# Patient Record
Sex: MALE | Race: WHITE | Employment: FULL TIME | URBAN - METROPOLITAN AREA
[De-identification: names, ages, dates, MRNs, and addresses within clinical notes are randomized per-mention and may not be internally consistent; named-entity substitution may affect disease eponyms.]

---

## 2017-07-31 PROBLEM — Z01.818 PRE-OP TESTING: Status: ACTIVE | Noted: 2017-07-31

## 2017-07-31 PROBLEM — N20.0 KIDNEY STONE: Status: ACTIVE | Noted: 2017-07-31

## 2017-08-16 ENCOUNTER — HOSPITAL ENCOUNTER (OUTPATIENT)
Age: 52
Setting detail: OUTPATIENT SURGERY
Discharge: HOME | End: 2017-08-16
Attending: UROLOGY | Admitting: UROLOGY
Payer: COMMERCIAL

## 2017-08-16 ENCOUNTER — ANESTHESIA (OUTPATIENT)
Dept: SURGERY | Age: 52
End: 2017-08-16
Payer: COMMERCIAL

## 2017-08-16 ENCOUNTER — APPOINTMENT (OUTPATIENT)
Dept: GENERAL RADIOLOGY | Age: 52
End: 2017-08-16
Attending: UROLOGY
Payer: COMMERCIAL

## 2017-08-16 ENCOUNTER — ANESTHESIA EVENT (OUTPATIENT)
Dept: SURGERY | Age: 52
End: 2017-08-16
Payer: COMMERCIAL

## 2017-08-16 VITALS
HEART RATE: 65 BPM | BODY MASS INDEX: 30.18 KG/M2 | RESPIRATION RATE: 18 BRPM | SYSTOLIC BLOOD PRESSURE: 110 MMHG | HEIGHT: 68 IN | OXYGEN SATURATION: 99 % | DIASTOLIC BLOOD PRESSURE: 76 MMHG | TEMPERATURE: 97.2 F | WEIGHT: 199.13 LBS

## 2017-08-16 PROBLEM — N20.1 RIGHT URETERAL CALCULUS: Status: ACTIVE | Noted: 2017-08-16

## 2017-08-16 PROCEDURE — 76060000032 HC ANESTHESIA 0.5 TO 1 HR: Performed by: UROLOGY

## 2017-08-16 PROCEDURE — 76210000020 HC REC RM PH II FIRST 0.5 HR: Performed by: UROLOGY

## 2017-08-16 PROCEDURE — 50590 FRAGMENTING OF KIDNEY STONE: CPT | Performed by: UROLOGY

## 2017-08-16 PROCEDURE — 74000 XR ABD (KUB): CPT

## 2017-08-16 PROCEDURE — 74011250636 HC RX REV CODE- 250/636

## 2017-08-16 RX ORDER — SODIUM CHLORIDE 0.9 % (FLUSH) 0.9 %
5-10 SYRINGE (ML) INJECTION AS NEEDED
Status: DISCONTINUED | OUTPATIENT
Start: 2017-08-16 | End: 2017-08-16 | Stop reason: HOSPADM

## 2017-08-16 RX ORDER — CEFAZOLIN SODIUM 2 G/50ML
2 SOLUTION INTRAVENOUS
Status: DISCONTINUED | OUTPATIENT
Start: 2017-08-16 | End: 2017-08-16 | Stop reason: HOSPADM

## 2017-08-16 RX ORDER — ZOLPIDEM TARTRATE 10 MG/1
1 TABLET ORAL
COMMUNITY
Start: 2017-07-31

## 2017-08-16 RX ORDER — SODIUM CHLORIDE 0.9 % (FLUSH) 0.9 %
5-10 SYRINGE (ML) INJECTION EVERY 8 HOURS
Status: DISCONTINUED | OUTPATIENT
Start: 2017-08-16 | End: 2017-08-16 | Stop reason: HOSPADM

## 2017-08-16 RX ORDER — FAMOTIDINE 20 MG/1
20 TABLET, FILM COATED ORAL ONCE
Status: DISCONTINUED | OUTPATIENT
Start: 2017-08-17 | End: 2017-08-16 | Stop reason: HOSPADM

## 2017-08-16 RX ORDER — ALBUTEROL SULFATE 90 UG/1
2 AEROSOL, METERED RESPIRATORY (INHALATION) AS NEEDED
COMMUNITY
Start: 2017-08-09

## 2017-08-16 RX ORDER — SODIUM CHLORIDE, SODIUM LACTATE, POTASSIUM CHLORIDE, CALCIUM CHLORIDE 600; 310; 30; 20 MG/100ML; MG/100ML; MG/100ML; MG/100ML
50 INJECTION, SOLUTION INTRAVENOUS CONTINUOUS
Status: DISCONTINUED | OUTPATIENT
Start: 2017-08-17 | End: 2017-08-16 | Stop reason: HOSPADM

## 2017-08-16 RX ORDER — FLUTICASONE FUROATE AND VILANTEROL 200; 25 UG/1; UG/1
1 POWDER RESPIRATORY (INHALATION)
COMMUNITY
Start: 2017-08-09

## 2017-08-16 NOTE — OP NOTES
Extracorporeal Shock Wave Lithotripsy Operative Note      Preoperative Diagnosis:  4 mm x 3 mm right proximal ureteral calculus  Stone Type: not known. Postoperative Diagnosis: no stone visualized on total ureteral fluoroscopy and simulation. Procedure: Procedure(s):  LITHOTRIPSY EXTRACORPOREAL SHOCKWAVE ESWL right, Simulation only. Surgeon(s): Irina Franz MD    Anesthesia: none  EBL:  None  Tubes and Drains:  None  Complications:  None    I discussed with patient options of shock wave lithotripsy (ESWL) and ureteroscopy (URS), either of which would be reasonable options for the patient. Explained that ureteroscopy has higher chance of success with a single treatment relative to SWL. However, it is more invasive and it is possible that will not be able to access ureter during initial procedure. ESWL risks include anesthesia risk, bleeding/hematoma, severe infection/sepsis, injury to ureter, kidney and other organs, and potential need for repeat procedures either due to inadequate breakup of the stone or Steinstrasse. He desires planned ESWL. Description of Procedure: The patient was identified and surgical site verification was performed prior to obtaining consent. The patient was brought to the lithotripsy suite and transferred to the lithotripsy table. The stone was not visualized with fluoroscopy. After multiple attempts and views of the entire right ureteral area, we could not visualize a ureteral calculus. I do believe there may be a small calculus in the dependent portion of the bladder. The procedure was then terminated as there was only simulation done and no formal ESWL. He will FU with me in 2 weeks for stone prevention protocols.     Irina Franz MD        8/16/2017  9:06 AM

## 2017-08-16 NOTE — IP AVS SNAPSHOT
[address] Patient: Manjit Gee MRN: [id number] :1965 You are allergic to the following Allergen Reactions Sting, Bee Swelling Recent Documentation Height Weight BMI Smoking Status 1.727 m 90.3 kg 30.28 kg/m2 Former Smoker Emergency Contacts Name Discharge Info Relation Home Work Mobile Tasha Gary DISCHARGE CAREGIVER [3] Spouse [3]   [phone] Gely Flash  Spouse [3] [phone] About your hospitalization You were admitted on:  2017 You last received care in the:  [address] [place] [address] Road You were discharged on:  2017 Unit phone number:  [phone] Why you were hospitalized Your primary diagnosis was:  Not on File Your diagnoses also included:  Right Ureteral Calculus Providers Seen During Your Hospitalizations Provider Role Specialty Primary office phone Peter Eckert MD Attending Provider Urology [phone] Your Primary Care Physician (PCP) Primary Care Physician Office Phone Office Fax Juancarlos Vera [phone] Follow-up Information Follow up With Details Comments Contact Info Hadley Huang MD   [address] [facility] [address] [place] [address] [id number] [phone] Your Appointments   9:45 AM EDT  
XRAY Visit with Ching Michaels [facility]. [address] ([address] [facility]) [address] [address]  
[phone]  10:15 AM EDT  
POST OP with Amador Mead MD  
[facility]. [address] ([address]) [address] [address]  
[phone] 2017 SHOULDER ARTHROSCOPY ACROMIOPLASTY ROTATOR CUFF REPAIR with Cyndi Love MD  
 [facility] SURGERY ([facility]) [address] [address]  
[phone] Friday September 01, 2017  7:30 AM EDT  
[facility] PSAT 60 MIN with [facility] PSAT RM 1  
[facility] OR PSAT ([facility]) [address] [address]  
[phone] Current Discharge Medication List  
  
CONTINUE these medications which have NOT CHANGED Dose & Instructions Dispensing Information Comments Morning Noon Evening Bedtime  
 albuterol 90 mcg/actuation inhaler Commonly known as:  PROVENTIL HFA, VENTOLIN HFA, PROAIR HFA Your last dose was: Your next dose is:    
   
   
 Dose:  2 Puff Take 2 Puffs by inhalation as needed. Refills:  0  
     
   
   
   
  
 CRESTOR 40 mg tablet Generic drug:  rosuvastatin Your last dose was: Your next dose is:    
   
   
  Refills:  0  
     
   
   
   
  
 ezetimibe 10 mg tablet Commonly known as:  Kristy Tavarez Your last dose was: Your next dose is:    
   
   
  Refills:  0  
     
   
   
   
  
 fenofibric acid 135 mg capsule Commonly known as:  TRILIPIX ER Your last dose was: Your next dose is:    
   
   
  Refills:  0  
     
   
   
   
  
 fluticasone-vilanterol 200-25 mcg/dose inhaler Commonly known as:  BREO ELLIPTA Your last dose was: Your next dose is:    
   
   
 Dose:  1 Puff Take 1 Puff by inhalation daily. Refills:  0  
     
   
   
   
  
 metoprolol succinate 50 mg XL tablet Commonly known as:  TOPROL-XL Your last dose was: Your next dose is:    
   
   
  Refills:  0  
     
   
   
   
  
 oxyCODONE-acetaminophen 5-325 mg per tablet Commonly known as:  PERCOCET Your last dose was: Your next dose is:    
   
   
 Dose:  1 Tab Take 1 Tab by mouth every four (4) hours as needed for Pain. Max Daily Amount: 6 Tabs. Quantity:  16 Tab Refills:  0  
     
   
   
   
  
 tamsulosin 0.4 mg capsule Commonly known as:  FLOMAX Your last dose was: Your next dose is:    
   
   
 Dose:  0.4 mg Take 1 Cap by mouth daily (after dinner). Quantity:  30 Cap Refills:  1  
     
   
   
   
  
 zolpidem 10 mg tablet Commonly known as:  AMBIEN Your last dose was: Your next dose is:    
   
   
 Dose:  1 Tab Take 1 Tab by mouth nightly. Refills:  0 Discharge Instructions DISCHARGE SUMMARY from Nurse The following personal items are in your possession at time of discharge: 
 
Dental Appliances: None Visual Aid: None Home Medications: None Jewelry: None Clothing: Pants, Shirt, Footwear, Socks, Undergarments Other Valuables: None PATIENT INSTRUCTIONS: 
 
After general anesthesia or intravenous sedation, for 24 hours or while taking prescription Narcotics: · Limit your activities · Do not drive and operate hazardous machinery · Do not make important personal or business decisions · Do  not drink alcoholic beverages · If you have not urinated within 8 hours after discharge, please contact your surgeon on call. Report the following to your surgeon: 
· Excessive pain, swelling, redness or odor of or around the surgical area · Temperature over 100.5 · Nausea and vomiting lasting longer than 4 hours or if unable to take medications · Any signs of decreased circulation or nerve impairment to extremity: change in color, persistent  numbness, tingling, coldness or increase pain · Any questions What to do at Home: These are general instructions for a healthy lifestyle: No smoking/ No tobacco products/ Avoid exposure to second hand smoke Surgeon General's Warning:  Quitting smoking now greatly reduces serious risk to your health. Obesity, smoking, and sedentary lifestyle greatly increases your risk for illness A healthy diet, regular physical exercise & weight monitoring are important for maintaining a healthy lifestyle You may be retaining fluid if you have a history of heart failure or if you experience any of the following symptoms:  Weight gain of 3 pounds or more overnight or 5 pounds in a week, increased swelling in our hands or feet or shortness of breath while lying flat in bed. Please call your doctor as soon as you notice any of these symptoms; do not wait until your next office visit. Recognize signs and symptoms of STROKE: 
 
F-face looks uneven A-arms unable to move or move unevenly S-speech slurred or non-existent T-time-call 911 as soon as signs and symptoms begin-DO NOT go Back to bed or wait to see if you get better-TIME IS BRAIN. Warning Signs of HEART ATTACK Call 911 if you have these symptoms: 
? Chest discomfort. Most heart attacks involve discomfort in the center of the chest that lasts more than a few minutes, or that goes away and comes back. It can feel like uncomfortable pressure, squeezing, fullness, or pain. ? Discomfort in other areas of the upper body. Symptoms can include pain or discomfort in one or both arms, the back, neck, jaw, or stomach. ? Shortness of breath with or without chest discomfort. ? Other signs may include breaking out in a cold sweat, nausea, or lightheadedness. Don't wait more than five minutes to call [address]! Fast action can save your life. Calling 911 is almost always the fastest way to get lifesaving treatment. Emergency Medical Services staff can begin treatment when they arrive  up to an hour sooner than if someone gets to the hospital by car. The discharge information has been reviewed with the patient. The patient verbalized understanding. Discharge medications reviewed with the patient and appropriate educational materials and side effects teaching were provided. Patient armband removed and given to patient to take home. Patient was informed of the privacy risks if armband lost or stolen Discharge Orders None Introducing [facility]! Guerline Ayala introduces [employer] patient portal. Now you can access parts of your medical record, email your doctor's office, and request medication refills online. 1. In your internet browser, go to [email or web address] 2. Click on the First Time User? Click Here link in the Sign In box. You will see the New Member Sign Up page. 3. Enter your [employer] Access Code exactly as it appears below. You will not need to use this code after youve completed the sign-up process. If you do not sign up before the expiration date, you must request a new code. · [employer] Access Code: [email or web address] Expires: 10/23/2017 10:11 AM 
 
4. Enter the last four digits of your Social Security Number (xxxx) and Date of Birth (mm/dd/yyyy) as indicated and click Submit. You will be taken to the next sign-up page. 5. Create a [employer] ID. This will be your [employer] login ID and cannot be changed, so think of one that is secure and easy to remember. 6. Create a [employer] password. You can change your password at any time. 7. Enter your Password Reset Question and Answer. This can be used at a later time if you forget your password. 8. Enter your e-mail address. You will receive e-mail notification when new information is available in [address] 9. Click Sign Up. You can now view and download portions of your medical record. 10. Click the Download Summary menu link to download a portable copy of your medical information. If you have questions, please visit the Frequently Asked Questions section of the [employer] website. Remember, [employer] is NOT to be used for urgent needs. For medical emergencies, dial 911. Now available from your iPhone and Android! General Information Please provide this summary of care documentation to your next provider. Patient Signature:  ____________________________________________________________ Date:  ____________________________________________________________  
  
Karla Fines Provider Signature:  ____________________________________________________________ Date:  ____________________________________________________________

## 2017-08-16 NOTE — DISCHARGE INSTRUCTIONS
DISCHARGE SUMMARY from Nurse    The following personal items are in your possession at time of discharge:    Dental Appliances: None  Visual Aid: None     Home Medications: None  Jewelry: None  Clothing: Pants, Shirt, Footwear, Socks, Undergarments  Other Valuables: None             PATIENT INSTRUCTIONS:    After general anesthesia or intravenous sedation, for 24 hours or while taking prescription Narcotics:  · Limit your activities  · Do not drive and operate hazardous machinery  · Do not make important personal or business decisions  · Do  not drink alcoholic beverages  · If you have not urinated within 8 hours after discharge, please contact your surgeon on call. Report the following to your surgeon:  · Excessive pain, swelling, redness or odor of or around the surgical area  · Temperature over 100.5  · Nausea and vomiting lasting longer than 4 hours or if unable to take medications  · Any signs of decreased circulation or nerve impairment to extremity: change in color, persistent  numbness, tingling, coldness or increase pain  · Any questions        What to do at Home:  These are general instructions for a healthy lifestyle:    No smoking/ No tobacco products/ Avoid exposure to second hand smoke    Surgeon General's Warning:  Quitting smoking now greatly reduces serious risk to your health. Obesity, smoking, and sedentary lifestyle greatly increases your risk for illness    A healthy diet, regular physical exercise & weight monitoring are important for maintaining a healthy lifestyle    You may be retaining fluid if you have a history of heart failure or if you experience any of the following symptoms:  Weight gain of 3 pounds or more overnight or 5 pounds in a week, increased swelling in our hands or feet or shortness of breath while lying flat in bed. Please call your doctor as soon as you notice any of these symptoms; do not wait until your next office visit.     Recognize signs and symptoms of STROKE:    F-face looks uneven    A-arms unable to move or move unevenly    S-speech slurred or non-existent    T-time-call 911 as soon as signs and symptoms begin-DO NOT go       Back to bed or wait to see if you get better-TIME IS BRAIN. Warning Signs of HEART ATTACK     Call 911 if you have these symptoms:   Chest discomfort. Most heart attacks involve discomfort in the center of the chest that lasts more than a few minutes, or that goes away and comes back. It can feel like uncomfortable pressure, squeezing, fullness, or pain.  Discomfort in other areas of the upper body. Symptoms can include pain or discomfort in one or both arms, the back, neck, jaw, or stomach.  Shortness of breath with or without chest discomfort.  Other signs may include breaking out in a cold sweat, nausea, or lightheadedness. Don't wait more than five minutes to call 911 - MINUTES MATTER! Fast action can save your life. Calling 911 is almost always the fastest way to get lifesaving treatment. Emergency Medical Services staff can begin treatment when they arrive -- up to an hour sooner than if someone gets to the hospital by car. The discharge information has been reviewed with the patient. The patient verbalized understanding. Discharge medications reviewed with the patient and appropriate educational materials and side effects teaching were provided. Patient armband removed and given to patient to take home.   Patient was informed of the privacy risks if armband lost or stolen

## 2017-08-16 NOTE — H&P
8/16/2017        ASSESSMENT:       ICD-10-CM ICD-9-CM     1. Kidney stone N20.0 592.0 AMB POC URINALYSIS DIP STICK AUTO W/O MICRO               PLAN:       1. Right ureteral stone- ESWL vs Ureteroscopy   I discussed with patient options of shock wave lithotripsy (SWL) and ureteroscopy (URS), either of which would be reasonable options for the patient. Explained that ureteroscopy has higher chance of success with a single treatment relative to SWL. However, it is more invasive and it is possible that will not be able to access the ureter during initial procedure (in which case would typically return to OR in 1-2 weeks after passive dilation with stent). It would require a stent which can be quite bothersome. SWL risks include anesthesia risk, bleeding/hematoma, severe infection/sepsis, injury to ureter, kidney and other organs, and potential need for repeat procedures either due to inadequate breakup of the stone or Steinstrasse. The patient may still require a stent which I will leave to the discression of my partner who will be treating the stone. Will check a KUB today to see if the stone is able to be visualized. If possible, will be scheduled for right ESWL after cardiac clearance. He will be scheduled at the next available date. Unfortunately KUB does not show a stone today. He will be simulated and treated today if the stone is visualized.     2. Prostate cancer screening- no prior records. Will check PSA after acute stone episode has resolved.                DISCUSSION:           Chief Complaint   Patient presents with    Kidney Stone       4 mm. Referred by Dr Soy You. Pt states it started over a week ago. Pt went to [facility].         HISTORY OF PRESENT ILLNESS:  Carri Nieto is a 46 y.o. male who presents today for consultation and has been referred by Dr. Aditya Nicholas.   He states his symptoms started with back pain last Tuesday around 4 AM, symptoms started with back pain that radiated to the front, constant. He had nausea with dry heaving intermittently as well. Denies gross hematuria. He is using a strainer and has not seen any stone pass. He had fever and chills a couple of days ago, he is taking Flomax daily, has also been increasing his water intake. Denies prior stones. He is taking Percocet which does alleviate his pain. 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 No cardiac issues since that time.      Flank pain: YES  Decreased output: NO  Difficulty voiding: NO  Burning with urination: YES  Notable blood in urine:  NO  Incontinence:  NO  Nocturia:   YES  How many times? 1       Review of Systems  Constitutional: Fever: No  Skin: Rash: No  HEENT: Hearing difficulty: No  Eyes: Blurred vision: No  Cardiovascular: Chest pain: No  Respiratory: Shortness of breath: No  Gastrointestinal: Nausea/vomiting: Yes  Musculoskeletal: Back pain: Yes  Neurological: Weakness: No  Psychological: Memory loss: No  Comments/additional findings:           Past Medical History:   Diagnosis Date    CAD (coronary artery disease)      Gastrointestinal disorder      Kidney stone                 Past Surgical History:   Procedure Laterality Date    CARDIAC SURG PROCEDURE UNLIST                  Social History   Substance Use Topics    Smoking status: Former Smoker    Smokeless tobacco: Never Used    Alcohol use No              Allergies   Allergen Reactions    Sting, Bee Swelling         History reviewed. No pertinent family history.            Current Outpatient Prescriptions   Medication Sig Dispense Refill    CRESTOR 40 mg tablet     0    ezetimibe (ZETIA) 10 mg tablet     0    metoprolol succinate (TOPROL-XL) 50 mg XL tablet     0    fenofibric acid (TRILIPIX ER) 135 mg capsule     0    oxyCODONE-acetaminophen (PERCOCET) 5-325 mg per tablet Take 1 Tab by mouth every four (4) hours as needed for Pain. Max Daily Amount: 6 Tabs.  16 Tab 0    ondansetron (ZOFRAN ODT) 4 mg disintegrating tablet Take 1 Tab by mouth every eight (8) hours as needed for Nausea. 30 Tab 0    tamsulosin (FLOMAX) 0.4 mg capsule Take 1 Cap by mouth daily for 15 days. 15 Cap 0         PHYSICAL EXAMINATION:        Visit Vitals    /76    Ht 5' 8\" (1.727 m)    Wt 199 lb (90.3 kg)    BMI 30.26 kg/m2      Constitutional: WDWN, Pleasant and appropriate affect, No acute distress. CV:  No peripheral swelling noted, RRR  Respiratory: No respiratory distress or difficulties, CTAB  Abdomen:  No abdominal masses or tenderness.  Male:  Right CVA tenderness  Skin: No jaundice. Neuro/Psych:  Alert and oriented x 3, affect appropriate. Lymphatic:   No enlarged inguinal lymph nodes.          REVIEW OF LABS AND IMAGING:             Results for orders placed or performed in visit on 07/31/17   AMB POC URINALYSIS DIP STICK AUTO W/O MICRO   Result Value Ref Range     Color (UA POC) Yellow       Clarity (UA POC) Clear       Glucose (UA POC) Negative Negative     Bilirubin (UA POC) Negative Negative     Ketones (UA POC) Negative Negative     Specific gravity (UA POC) 1.020 1.001 - 1.035     Blood (UA POC) Trace Negative     pH (UA POC) 6.5 4.6 - 8.0     Protein (UA POC) Negative Negative mg/dL     Urobilinogen (UA POC) 0.2 mg/dL 0.2 - 1     Nitrites (UA POC) Negative Negative     Leukocyte esterase (UA POC) Negative Negative            Imaging Report Reviewed? YES  Images Reviewed? NO     7/25/17 EXAMINATION: CT ABD PELV WO CONT      CLINICAL INDICATION: Stone? Right flank/right abdominal pain           COMPARISON:  None      TECHNIQUE:  Multiple contiguous axial CT images at 5  mm increments were  obtained from the bases of the lungs to the  pubic symphysis without contrast.      FINDINGS:  4 mm stone proximal right ureter causing mild right hydronephrosis.     Bibasilar subpleural thickening and chronic subpleural scarring.      Left subdiaphragmatic hepatic low density 15 mm. Partially evaluated.  Right  posterior medial hepatic low-density 19 mm on image 22 series 2, partially  evaluated. Gallbladder and pancreas and spleen normal. Adrenal glands normal.      Moderate scarring plaquing of the aorta. Punctate calcification left mid kidney.      Extensive sigmoid diverticulosis. Mild prostate enlargement with calcifications.     IMPRESSION:       1. Moderate hydronephrosis secondary to a 4 mm proximal right ureteral stone. 2. Low densities in the liver, partially evaluated. Nonemergent CT with  intravenous contrast would be the next up in evaluation. 3. Nonobstructive punctate gas occasional left kidney. 4. Extensive sigmoid diverticulosis  5. Bibasilar pleural thickening and subpleural scarring. 6. Mild prostate enlargement. Please correlate clinically.     Other Lab Data Reviewed?    NO        Anil Raines MD

## 2017-10-13 ENCOUNTER — HOSPITAL ENCOUNTER (OUTPATIENT)
Dept: PHYSICAL THERAPY | Age: 52
Discharge: HOME | End: 2017-10-13
Payer: COMMERCIAL

## 2017-10-13 PROCEDURE — 97110 THERAPEUTIC EXERCISES: CPT

## 2017-10-13 PROCEDURE — 97162 PT EVAL MOD COMPLEX 30 MIN: CPT

## 2017-10-13 PROCEDURE — 97140 MANUAL THERAPY 1/> REGIONS: CPT

## 2017-10-13 NOTE — PROGRESS NOTES
[address] [facility] AT [address], [address] [place] [address] Road  Phone: [phone]   [phone]  PLAN OF CARE / [address] [place] [address] PHYSICAL THERAPY SERVICES  Patient Name: Elpidio Amaro : 1965   Medical   Diagnosis: Left shoulder pain [M25.512] Treatment Diagnosis: M25.512   Onset Date: 17     Referral Source: Estephania Dueñas MD [facility]): 10/13/2017   Prior Hospitalization: See medical history Provider #: [id number]   Prior Level of Function: Independent w/ ADL's   Comorbidities: Heart disease, Asthma   Medications: Verified on Patient Summary List   The Plan of Care and following information is based on the information from the initial evaluation.   ===========================================================================================  Assessment / key information:  Patient is a 45 y/o male presenting s/p left RTC repair (17) and (tenodesis?) to bicep tendon. Patient reports discontinuing sling a few days ago and reports general aching of 3/10, up to 5-6/10 with inadvertent quick or jerking movements. Patient presents with mild/mod left RTC and deltoid atrophy. There is moderate TTP to the right bicep tendon and pectoral muscle. Passive ROM of the L shoulder is: Flexion 90 degrees, abduction 65 degrees, ER 30 degrees in scapular plane, IR full in scapular plane. Strength testing deferred at this time. The patient was instructed in a home exercise program to address the above findings/deficits.   The patient should benefit from therapy services to progress toward functional goals and improve quality of life.  ===========================================================================================  History MEDIUM  Complexity : 1-2 comorbidities / personal factors will impact the outcome/ POC ; Examination MEDIUM Complexity : 3 Standardized tests and measures addressing body structure, function, activity limitation and / or participation in recreation ; Presentation MEDIUM Complexity : Evolving with changing characteristics ; Decision Making MEDIUM Complexity : FOTO score of 26-74; Complexity MEDIUM  Problem List: pain affecting function, decrease ROM, decrease strength, decrease ADL/ functional abilitiies, decrease activity tolerance and decrease flexibility/ joint mobility   Treatment Plan may include any combination of the following: Therapeutic exercise, Therapeutic activities, Physical agent/modality, Manual therapy and Patient education  Patient / Family readiness to learn indicated by: asking questions, trying to perform skills and interest  Persons(s) to be included in education: patient (P)  Barriers to Learning/Limitations: None  Measures taken:    Patient Goal (s): Back to normal use without pain   Patient self reported health status: good  Rehabilitation Potential: good   Short Term Goals: To be accomplished in  3-4  weeks: Independent/compliant with long term HEP for shoulder ROM  Improve passive shoulder flexion to 135 degrees to improve ADL's  Able to lift upper extremity actively to 90 degrees with min/no substitution to assist reaching ADL's   Long Term Goals:  To be accomplished in  6-8  weeks:  Improve FOTO outcome score by 30% to indicate a significant improvement in ADL function  Patient will report 75% improvement with reaching/lifting ADL's at shoulder level or below  Patient will report 60% improvement with dressing/bathing ADL's behind the back  Frequency / Duration:   Patient to be seen  2  times per week for 6-8  weeks:  Patient / Caregiver education and instruction: activity modification and exercises  G-Codes (GP): JESSIE  Therapist Signature: Joaquin Rico PT OCS Date: 21/06/2806   Certification Period: NA Time: 9:12 AM   ===========================================================================================  I certify that the above Physical Therapy Services are being furnished while the patient is under my care. I agree with the treatment plan and certify that this therapy is necessary. Physician Signature:        Date:       Time:     Please sign and return to [facility] or you may fax the signed copy to [phone]. Thank you.

## 2017-10-13 NOTE — PROGRESS NOTES
SHOULDER EVALUATION/ DAILY TREATMENT NOTE    Patient Name: Quique Griggs  Date:10/13/2017  : 1965  [x]  Patient  Verified  Payor: Darryle Blander / Plan: Jerri Bruno / Product Type: HMO /    In time:9:00  Out time:9:58  Total Treatment Time (min): 58  Total Timed Codes (min): 58  1:1 Treatment Time ( only):    Visit #: 1 of     Treatment Area: Left shoulder pain [M25.512]    SUBJECTIVE HISTORY:   See POC  Pain Level (0-10 scale): 3    Posture: See POC    ROM:  [] Unable to assess at this time                                           AROM                                                              PROM   Left Right  Left Right   Flexion NT  Flexion 90    Extension   Extension 15    Abduction   Abduction 65    ER @ 0 Degrees   ER in scapular plane 30    ER @ 90 Degrees   ER @ 90 Degrees     IR @ 90 Degrees   IR scapular plane WFL      End Feel / Painful Arc:    Strength:   [x] Unable to assess at this time                                                                            L (1-5) R (1-5) Pain   Flexors   [] Yes   [] No   Abductors   [] Yes   [] No   External Rotators   [] Yes   [] No   Internal Rotators   [] Yes   [] No   Supraspinatus   [] Yes   [] No   Serratus Anterior   [] Yes   [] No   Lower Trapezius   [] Yes   [] No   Elbow Flexion   [] Yes   [] No   Elbow Extension   [] Yes   [] No       Scapulohumoral Control / Rhythm:  Able to eccentrically lower with good control?  Left: [] Yes   [x] No     Right: [] Yes   [] No      Palpation:See POC[] Min  [] Mod  [] Severe    Location:  [] Min  [] Mod  [] Severe    Location:  [] Min  [] Mod  [] Severe    Location:    Special Tests:  Adson's Test  [] Pos   [] Neg Yergason's Test [] Pos   [] Neg  Tierney's Test  [] Pos   [] Neg ER Lag Sign     [] Pos   [] Neg  Neer's Test  [] Pos   [] Neg IR Lift Off Sign  [] Pos   [] Neg  Hawkin's Test  [] Pos   [] Neg AC Joint  [] Pos   [] Neg  Speed's Test  [] Pos   [] Neg SC Joint  [] Pos   [] Neg  Empty Can  [] Pos   [] Neg Pectoral Tightness [] Pos   [] Neg  Anterior Apprehension [] Pos   [] Neg   Posterior Apprehension [] Pos   [] Neg  Other:     OBJECTIVE TREATMENT:  Modality (rationale): Decrease shoulder pain to improve lifting/reaching ADL's  []  E-Stim: type _ x _ min     []att   []unatt   []w/US   []w/ice   []w/heat  []  Ultrasound: []cont   []pulse    _ W/cm2 x _  min   []1MHz   []3MHz  []  Iontophoresis: []take home patch w/ dexamethazone    []40mA   []80mA                               []_ mA min w/: []dexamethazone   []other:_  []  Ice pack _  min     [] Hot pack _  min     [] Paraffin _  min  [x]  Other: VASO x 10 min  Therapeutic Exercise: [x] see flow sheet     [] Other:_  Added/Changed Exercises:  [x]  Added:_See HEP  to improve (function): Shoulder ROM and strength  []  Changed:_ to improve (function):  (minutes: 10)  Manual therapy: 8 min- PROM of the L shoulder and scapula    Other Objective/Functional Measures:   Pain Level (0-10 scale) post treatment: 3    ASSESSMENT  [x]  See Plan of Care  Patient is assigned a medium evaluation complexity based upon presence of heart disease, FOTO score, and post-operative symptom characteristics.     PLAN  See [address] [place], [address] [place] [address] 10/13/2017  8:59 AM

## 2017-10-18 ENCOUNTER — HOSPITAL ENCOUNTER (OUTPATIENT)
Dept: PHYSICAL THERAPY | Age: 52
Discharge: HOME | End: 2017-10-18
Payer: COMMERCIAL

## 2017-10-18 PROCEDURE — 97110 THERAPEUTIC EXERCISES: CPT

## 2017-10-18 PROCEDURE — 97016 VASOPNEUMATIC DEVICE THERAPY: CPT

## 2017-10-18 PROCEDURE — 97140 MANUAL THERAPY 1/> REGIONS: CPT

## 2017-10-18 NOTE — PROGRESS NOTES
PT DAILY TREATMENT NOTE     Patient Name: Bipin Boyer  Date:10/18/2017  : 1965  [x]  Patient  Verified  Payor: Martínarchana Laws / Plan: Emilzena Burns / Product Type: HMO /    In time:9:07  Out time:9:58  Total Treatment Time (min): 51  Total Timed Codes (min): 51  1:1 Treatment Time (min):    Visit #: 2 of     Treatment Area: Left shoulder pain [M25.512]    SUBJECTIVE  Pain Level (0-10 scale): 3  Any medication changes, allergies to medications, adverse drug reactions, diagnosis change, or new procedure performed?: [x] No    [] Yes (see summary sheet for update)  Subjective functional status/changes:   [] No changes reported  I'm a little sore, but I'm doing the home exercises as directed.     OBJECTIVE  Modality rationale: decrease pain to improve the patients ability to change upper limb position for lifting, reaching and dressing tasks   Min Type Additional Details    [] Estim: []Att   []Unatt        []TENS instruct                  []IFC  []Premod   []NMES                     []Other:  []w/US   []w/ice   []w/heat  Position:  Location:    []  Traction: [] Cervical       []Lumbar                       [] Prone          []Supine                       []Intermittent   []Continuous Lbs:  [] before manual  [] after manual    []  Ultrasound: []Continuous   [] Pulsed                           []1MHz   []3MHz Location:  W/cm2:    []  Iontophoresis with dexamethasone         Location: [] Take home patch   [] In clinic    []  Ice     []  heat  []  Ice massage Position:  Location:   10 [x]  Vasopneumatic Device Pressure:       [] lo [x] med [] hi   Temperature: [x] lo [] med [] hi   [] Skin assessment post-treatment:  []intact []redness- no adverse reaction       []redness  adverse reaction:       31 min Therapeutic Exercise:  [] See flow sheet :   Rationale: increase ROM to improve the patients ability to change upper limb position for lifting, reaching and dressing tasks    10 min Manual Therapy:  PROM of the left scapula and GH joints   Rationale: increase ROM and increase tissue extensibility to improve reaching, dressing ADL's    Pain Level (0-10 scale) post treatment: 1    ASSESSMENT/Changes in Function: Pt demonstrates moderate improvement with shoulder PROM (roughly 105 degrees elevation and 35-40 degrees ER) and required moderate cueing with muscle guarding. Patient will continue to benefit from skilled PT services to modify and progress therapeutic interventions, address ROM deficits, address strength deficits and analyze and address soft tissue restrictions to attain remaining goals.      []  See Plan of Care  []  See progress note/recertification  []  See Discharge Summary           PLAN  []  Upgrade activities as tolerated     [x]  Continue plan of care  []  Update interventions per flow sheet       []  Discharge due to:_  []  Other:_      Yulisa Carver PT 10/18/2017  7:41 AM

## 2017-10-20 ENCOUNTER — HOSPITAL ENCOUNTER (OUTPATIENT)
Dept: PHYSICAL THERAPY | Age: 52
Discharge: HOME | End: 2017-10-20
Payer: COMMERCIAL

## 2017-10-20 PROCEDURE — 97140 MANUAL THERAPY 1/> REGIONS: CPT

## 2017-10-20 PROCEDURE — 97110 THERAPEUTIC EXERCISES: CPT

## 2017-10-20 PROCEDURE — 97016 VASOPNEUMATIC DEVICE THERAPY: CPT

## 2017-10-20 NOTE — PROGRESS NOTES
PT DAILY TREATMENT NOTE     Patient Name: Sushil Rangel  Date:10/20/2017  : 1965  [x]  Patient  Verified  Payor: Bala Pitts / Plan: Chico Joiner / Product Type: HMO /    In time:8:55  Out time:9:45  Total Treatment Time (min): 50  Total Timed Codes (min): 50  1:1 Treatment Time (min):    Visit #: 3 of     Treatment Area: Left shoulder pain [M25.512]    SUBJECTIVE  Pain Level (0-10 scale): 0  Any medication changes, allergies to medications, adverse drug reactions, diagnosis change, or new procedure performed?: [x] No    [] Yes (see summary sheet for update)  Subjective functional status/changes:   [] No changes reported  It's not sore right now, it is still sore of course when I'm stretching it.      OBJECTIVE  Modality rationale: decrease pain to improve the patients ability to change upper limb position for lifting, reaching and dressing tasks   Min Type Additional Details    [] Estim: []Att   []Unatt        []TENS instruct                  []IFC  []Premod   []NMES                     []Other:  []w/US   []w/ice   []w/heat  Position:  Location:    []  Traction: [] Cervical       []Lumbar                       [] Prone          []Supine                       []Intermittent   []Continuous Lbs:  [] before manual  [] after manual    []  Ultrasound: []Continuous   [] Pulsed                           []1MHz   []3MHz Location:  W/cm2:    []  Iontophoresis with dexamethasone         Location: [] Take home patch   [] In clinic    []  Ice     []  heat  []  Ice massage Position:  Location:   10 [x]  Vasopneumatic Device Pressure:       [] lo [x] med [] hi   Temperature: [x] lo [] med [] hi   [] Skin assessment post-treatment:  []intact []redness- no adverse reaction       []redness  adverse reaction:       35 min Therapeutic Exercise:  [] See flow sheet :   Rationale: increase ROM to improve the patients ability to change upper limb position for lifting, reaching and dressing tasks    10 min Manual Therapy:  PROM of the L scapula and shoulder   Rationale: increase ROM and increase tissue extensibility to change upper limb position for lifting, reaching and dressing tasks    Pain Level (0-10 scale) post treatment: 0    ASSESSMENT/Changes in Function: Pt continues to progress slowly with passive/active assisted ROM in all planes. Also initiated light isometrics for gentle RTC facilitation. Patient will continue to benefit from skilled PT services to modify and progress therapeutic interventions, address ROM deficits, address strength deficits and analyze and cue movement patterns to attain remaining goals.      []  See Plan of Care  []  See progress note/recertification  []  See Discharge Summary         Progress towards goals / Updated goals:  GOAL: Improve passive shoulder flexion to 135 degrees to improve ADL's- Progressing, not met    PLAN  []  Upgrade activities as tolerated     [x]  Continue plan of care  []  Update interventions per flow sheet       []  Discharge due to:_  []  Other:_      Allison Guerra, PT 10/20/2017  9:00 AM

## 2017-10-25 ENCOUNTER — HOSPITAL ENCOUNTER (OUTPATIENT)
Dept: PHYSICAL THERAPY | Age: 52
Discharge: HOME | End: 2017-10-25
Payer: COMMERCIAL

## 2017-10-25 PROCEDURE — 97140 MANUAL THERAPY 1/> REGIONS: CPT

## 2017-10-25 PROCEDURE — 97016 VASOPNEUMATIC DEVICE THERAPY: CPT

## 2017-10-25 PROCEDURE — 97110 THERAPEUTIC EXERCISES: CPT

## 2017-10-25 NOTE — PROGRESS NOTES
PT DAILY TREATMENT NOTE     Patient Name: Aurora Poll  Date:10/25/2017  : 1965  [x]  Patient  Verified  Payor: Liza Quintana / Plan: Carl Rowe / Product Type: HMO /    In time:9:33  Out time:10:29  Total Treatment Time (min): 56  Total Timed Codes (min): 56  1:1 Treatment Time (min):    Visit #: 4 of     Treatment Area: Left shoulder pain [M25.512]    SUBJECTIVE  Pain Level (0-10 scale): 1  Any medication changes, allergies to medications, adverse drug reactions, diagnosis change, or new procedure performed?: [x] No    [] Yes (see summary sheet for update)  Subjective functional status/changes:   [] No changes reported  I feel like the stiffness and ROM are really progressing in the past few days.     OBJECTIVE  Modality rationale: decrease pain to improve the patients ability to change upper limb position for lifting, reaching and dressing tasks   Min Type Additional Details    [] Estim: []Att   []Unatt        []TENS instruct                  []IFC  []Premod   []NMES                     []Other:  []w/US   []w/ice   []w/heat  Position:  Location:    []  Traction: [] Cervical       []Lumbar                       [] Prone          []Supine                       []Intermittent   []Continuous Lbs:  [] before manual  [] after manual    []  Ultrasound: []Continuous   [] Pulsed                           []1MHz   []3MHz Location:  W/cm2:    []  Iontophoresis with dexamethasone         Location: [] Take home patch   [] In clinic    []  Ice     []  heat  []  Ice massage Position:  Location:    []  Vasopneumatic Device Pressure:       [] lo [] med [] hi   Temperature: [] lo [] med [] hi   [] Skin assessment post-treatment:  []intact []redness- no adverse reaction       []redness  adverse reaction:       38 min Therapeutic Exercise:  [] See flow sheet :   Rationale: increase ROM and increase strength to improve the patients ability to change upper limb position for lifting, reaching and dressing tasks    11 min Manual Therapy:  Passive ROM of the L scapula and GH joint in all planes   Rationale: increase tissue extensibility to change upper limb position for lifting, reaching and dressing tasks    Pain Level (0-10 scale) post treatment: 0    ASSESSMENT/Changes in Function: Added light resisted T-band pulldowns at low elevation. Pt actually reported less pain/stiffness upon completion of this activity. Patient will continue to benefit from skilled PT services to modify and progress therapeutic interventions, address ROM deficits, address strength deficits and analyze and address soft tissue restrictions to attain remaining goals.      []  See Plan of Care  []  See progress note/recertification  []  See Discharge Summary           PLAN  []  Upgrade activities as tolerated     [x]  Continue plan of care  []  Update interventions per flow sheet       []  Discharge due to:_  []  Other:_      Kishore Milner, PT 10/25/2017  7:53 AM

## 2017-10-27 ENCOUNTER — APPOINTMENT (OUTPATIENT)
Dept: PHYSICAL THERAPY | Age: 52
End: 2017-10-27
Payer: COMMERCIAL

## 2017-11-01 ENCOUNTER — HOSPITAL ENCOUNTER (OUTPATIENT)
Dept: PHYSICAL THERAPY | Age: 52
Discharge: HOME | End: 2017-11-01
Payer: COMMERCIAL

## 2017-11-01 PROCEDURE — 97140 MANUAL THERAPY 1/> REGIONS: CPT

## 2017-11-01 PROCEDURE — 97110 THERAPEUTIC EXERCISES: CPT

## 2017-11-01 NOTE — PROGRESS NOTES
PT DAILY TREATMENT NOTE     Patient Name: Mark Flaherty  Date:2017  : 1965  [x]  Patient  Verified  Payor: Gladis Carvalho / Plan: Rula Jacinto / Product Type: HMO /    In time:9:03  Out time:10:07  Total Treatment Time (min): 64  Total Timed Codes (min): 64  1:1 Treatment Time (min):    Visit #: 5 of     Treatment Area: Left shoulder pain [M25.512]    SUBJECTIVE  Pain Level (0-10 scale): 0  Any medication changes, allergies to medications, adverse drug reactions, diagnosis change, or new procedure performed?: [x] No    [] Yes (see summary sheet for update)  Subjective functional status/changes:   [] No changes reported  Pt states he is working on his ROM and sees slow but steady improvement. Pain has been improving as well.      OBJECTIVE  Modality rationale: decrease pain to improve the patients ability to change upper limb position for lifting, reaching and dressing tasks   Min Type Additional Details    [] Estim: []Att   []Unatt        []TENS instruct                  []IFC  []Premod   []NMES                     []Other:  []w/US   []w/ice   []w/heat  Position:  Location:    []  Traction: [] Cervical       []Lumbar                       [] Prone          []Supine                       []Intermittent   []Continuous Lbs:  [] before manual  [] after manual    []  Ultrasound: []Continuous   [] Pulsed                           []1MHz   []3MHz Location:  W/cm2:    []  Iontophoresis with dexamethasone         Location: [] Take home patch   [] In clinic    []  Ice     []  heat  []  Ice massage Position:  Location:   10 [x]  Vasopneumatic Device- L shoulder Pressure:       [] lo [] med [] hi   Temperature: [] lo [] med [] hi   [] Skin assessment post-treatment:  []intact []redness- no adverse reaction       []redness  adverse reaction:       44 min Therapeutic Exercise:  [] See flow sheet :   Rationale: increase ROM and increase strength to improve the patients ability to change upper limb position for lifting, reaching and dressing tasks    10 min Manual Therapy:  PROM of the L shoulder in all planes   Rationale: increase ROM and increase tissue extensibility to improve the patient's ability to change upper limb position for lifting, reaching and dressing tasks    Pain Level (0-10 scale) post treatment: 0    ASSESSMENT/Changes in Function: Added IR stretching behind back (pt able to reach belt line w/ discomfort), as well as short lever scaption AROM to shoulder level. Moderate discomfort and substitution noted, which was corrected and minimized w/ mirror feedback. Patient will continue to benefit from skilled PT services to modify and progress therapeutic interventions, address ROM deficits, address strength deficits and analyze and address soft tissue restrictions to attain remaining goals.      []  See Plan of Care  []  See progress note/recertification  []  See Discharge Summary         Progress towards goals / Updated goals:  STG #2: Improve passive shoulder flexion to 135 degrees to improve ADL's- Progressing, roughly 120 degrees observed today    PLAN  []  Upgrade activities as tolerated     [x]  Continue plan of care  []  Update interventions per flow sheet       []  Discharge due to:_  []  Other:_      Crystal Carlson, PT 11/1/2017  7:48 AM

## 2017-11-03 ENCOUNTER — HOSPITAL ENCOUNTER (OUTPATIENT)
Dept: PHYSICAL THERAPY | Age: 52
Discharge: HOME | End: 2017-11-03
Payer: COMMERCIAL

## 2017-11-03 PROCEDURE — 97140 MANUAL THERAPY 1/> REGIONS: CPT

## 2017-11-03 PROCEDURE — 97110 THERAPEUTIC EXERCISES: CPT

## 2017-11-03 NOTE — PROGRESS NOTES
PT DAILY TREATMENT NOTE     Patient Name: Judson Hyatt  Date:11/3/2017  : 1965  [x]  Patient  Verified  Payor: Brady Storey / Plan: Judge Perry / Product Type: HMO /    In time:9:02  Out time:10:08  Total Treatment Time (min): 66  Total Timed Codes (min): 66  1:1 Treatment Time (min):    Visit #: 6 of     Treatment Area: Left shoulder pain [M25.512]    SUBJECTIVE  Pain Level (0-10 scale): 2  Any medication changes, allergies to medications, adverse drug reactions, diagnosis change, or new procedure performed?: [x] No    [] Yes (see summary sheet for update)  Subjective functional status/changes:   [] No changes reported  Pt reports the exercises and motions are all progressing slowly but steadily.     OBJECTIVE  Modality rationale: decrease pain to improve the patients ability to change upper limb position for lifting, reaching and dressing tasks   Min Type Additional Details    [] Estim: []Att   []Unatt        []TENS instruct                  []IFC  []Premod   []NMES                     []Other:  []w/US   []w/ice   []w/heat  Position:  Location:    []  Traction: [] Cervical       []Lumbar                       [] Prone          []Supine                       []Intermittent   []Continuous Lbs:  [] before manual  [] after manual    []  Ultrasound: []Continuous   [] Pulsed                           []1MHz   []3MHz Location:  W/cm2:    []  Iontophoresis with dexamethasone         Location: [] Take home patch   [] In clinic    []  Ice     []  heat  []  Ice massage Position:  Location:   10 [x]  Vasopneumatic Device Pressure:       [] lo [x] med [] hi   Temperature: [x] lo [] med [] hi   [] Skin assessment post-treatment:  []intact []redness- no adverse reaction       []redness  adverse reaction:       46 min Therapeutic Exercise:  [] See flow sheet :   Rationale: increase ROM and increase strength to improve the patients ability to change upper limb position for lifting, reaching and dressing tasks    10 min Manual Therapy:  PROM of the L shoulder in all planes   Rationale: increase tissue extensibility to improve the patient's ability to change upper limb position for lifting, reaching and dressing tasks    Pain Level (0-10 scale) post treatment: 0    ASSESSMENT/Changes in Function: Added lightly resisted Tband IR/ER today with no issues other than fatigue. HEP updated along with T-bands provided for patients home use. Patient will continue to benefit from skilled PT services to address ROM deficits, address strength deficits, analyze and address soft tissue restrictions and analyze and cue movement patterns to attain remaining goals.      []  See Plan of Care  []  See progress note/recertification  []  See Discharge Summary       PLAN  []  Upgrade activities as tolerated     [x]  Continue plan of care  []  Update interventions per flow sheet       []  Discharge due to:_  []  Other:_      Harjinder Nava, PT 11/3/2017  8:00 AM

## 2017-11-17 ENCOUNTER — HOSPITAL ENCOUNTER (OUTPATIENT)
Dept: PHYSICAL THERAPY | Age: 52
Discharge: HOME | End: 2017-11-17
Payer: COMMERCIAL

## 2017-11-17 PROCEDURE — 97140 MANUAL THERAPY 1/> REGIONS: CPT

## 2017-11-17 PROCEDURE — 97016 VASOPNEUMATIC DEVICE THERAPY: CPT

## 2017-11-17 PROCEDURE — 97110 THERAPEUTIC EXERCISES: CPT

## 2017-11-17 NOTE — PROGRESS NOTES
Windy Shah, PT Physical Therapist Incomplete  Progress Notes Date of Service: 11/15/17 0803   Related encounter: PT GENERAL F/U from 11/15/2017 in [facility]         []Hide copied text  [facility] AT [address] [place] [address], [address] [place] [address] Road  Phone: [phone]   [phone]    PROGRESS NOTE            Patient Name: Elpidio Amaro : 1965   Treatment/Medical Diagnosis: Left shoulder pain [M25.512]   Referral Source: Estephania Dueñas MD       Date of Initial Visit: 10/13/17 Attended Visits: 7 Missed Visits: 1      SUMMARY OF TREATMENT  Therapeutic exercise for shoulder ROM, manual therapy, pain modalities, HEP    CURRENT STATUS  Patient is progressing very well with rehab s/p left RTC repair (17). He reports mild increased soreness in bicep region secondary to increased active use of the left arm recently. He reports good compliance with HEP and reports improving ROM with reaching overhead as well as behind his back with dressing ADL's. At this time, patient's greatest deficits are progressing to end range overhead AROM and RTC/scapular strength. Shoulder ROM: Flexion: 115 AROM/148 PROM, ER 75, IR behind back to L3 level. · Short Term Goals: To be accomplished in  3-4  weeks: Independent/compliant with long term HEP for shoulder ROM- Met and ongoing  Improve passive shoulder flexion to 135 degrees to improve ADL's- Met  Able to lift upper extremity actively to 90 degrees with min/no substitution to assist reaching ADL's- Met, 115 degrees  · Long Term Goals:  To be accomplished in  6-8  weeks:  Improve FOTO outcome score by 30% to indicate a significant improvement in ADL function- Met, improved from 32/100 to 63/100  Patient will report 75% improvement with reaching/lifting ADL's at shoulder level or below- Progressing  Patient will report 60% improvement with dressing/bathing ADL's behind the back- Not met, progressing    RECOMMENDATIONS    Recommend patient continue therapy 2x/week for 4-6 weeks with emphasis on full AROM and progressive RTC strengthening within protocol. If you have any questions/comments please contact us directly at [phone]. Thank you for allowing us to assist in the care of your patient.     Therapist Signature: Jose Juan Hammond PT, [facility] Date: 11/17/2017       Time: 8:04 AM   NOTE TO PHYSICIAN:  Jhoana White 172 FAX TO   [facility] UNIT: [phone].   If you are unable to process this request in 24 hours please contact our office: [phone].     ___ I have read the above report and request that my patient continue as recommended.   ___ I have read the above report and request that my patient continue therapy with the following changes/special instructions:_________________________________________________________   ___ I have read the above report and request that my patient be discharged from therapy.      Physician Signature:                                                                                                                         Date:                                                                   Time:

## 2017-11-17 NOTE — PROGRESS NOTES
PT DAILY TREATMENT NOTE     Patient Name: Bipin Boyer  Date:2017  : 1965  [x]  Patient  Verified  Payor: Martínarchana Laws / Plan: Emil Burns / Product Type: HMO /    In time:8:07  Out time:9:00  Total Treatment Time (min): 53  Total Timed Codes (min): 53  1:1 Treatment Time (min):    Visit #: 7 of     Treatment Area: Left shoulder pain [M25.512]    SUBJECTIVE  Pain Level (0-10 scale): 0  Any medication changes, allergies to medications, adverse drug reactions, diagnosis change, or new procedure performed?: [x] No    [] Yes (see summary sheet for update)  Subjective functional status/changes:   [] No changes reported  See Progress Note    OBJECTIVE  Modality rationale: decrease pain to improve the patients ability to change upper limb position for lifting, reaching and dressing tasks   Min Type Additional Details    [] Estim: []Att   []Unatt        []TENS instruct                  []IFC  []Premod   []NMES                     []Other:  []w/US   []w/ice   []w/heat  Position:  Location:    []  Traction: [] Cervical       []Lumbar                       [] Prone          []Supine                       []Intermittent   []Continuous Lbs:  [] before manual  [] after manual    []  Ultrasound: []Continuous   [] Pulsed                           []1MHz   []3MHz Location:  W/cm2:    []  Iontophoresis with dexamethasone         Location: [] Take home patch   [] In clinic    []  Ice     []  heat  []  Ice massage Position:  Location:   10 [x]  Vasopneumatic Device Pressure:       [] lo [x] med [] hi   Temperature: [x] lo [] med [] hi   [] Skin assessment post-treatment:  []intact []redness- no adverse reaction       []redness  adverse reaction:       33 min Therapeutic Exercise:  [] See flow sheet :   Rationale: increase ROM and increase strength to improve the patients ability to change upper limb position for lifting, reaching and dressing tasks    10 min Manual Therapy:  PROM of the L shoulder in all planes   Rationale: increase ROM and increase tissue extensibility to improve the patient's ability to change upper limb position for lifting, reaching and dressing tasks    Pain Level (0-10 scale) post treatment: 0    ASSESSMENT/Changes in Function:  Patient will continue to benefit from skilled PT services to modify and progress therapeutic interventions, address ROM deficits, address strength deficits and analyze and cue movement patterns to attain remaining goals.      []  See Plan of Care  [x]  See progress note/recertification  []  See Discharge Summary     PLAN  []  Upgrade activities as tolerated     [x]  Continue plan of care  []  Update interventions per flow sheet       []  Discharge due to:_  []  Other:_      Kishore Milner, PT 11/17/2017  7:49 AM

## 2017-11-22 ENCOUNTER — APPOINTMENT (OUTPATIENT)
Dept: PHYSICAL THERAPY | Age: 52
End: 2017-11-22
Payer: COMMERCIAL

## 2017-11-29 ENCOUNTER — HOSPITAL ENCOUNTER (OUTPATIENT)
Dept: PHYSICAL THERAPY | Age: 52
Discharge: HOME | End: 2017-11-29
Payer: COMMERCIAL

## 2017-11-29 PROCEDURE — 97140 MANUAL THERAPY 1/> REGIONS: CPT

## 2017-11-29 PROCEDURE — 97110 THERAPEUTIC EXERCISES: CPT

## 2017-11-29 NOTE — PROGRESS NOTES
PT DAILY TREATMENT NOTE     Patient Name: Compa Williamson  Date:2017  : 1965  [x]  Patient  Verified  Payor: Rosemary Olson / Plan: Pablo Jacobo / Product Type: HMO /    In time:8:30  Out time:9:20  Total Treatment Time (min): 50  Total Timed Codes (min): 50  1:1 Treatment Time (min):    Visit #: 8 of     Treatment Area: Left shoulder pain [M25.512]    SUBJECTIVE  Pain Level (0-10 scale): 0  Any medication changes, allergies to medications, adverse drug reactions, diagnosis change, or new procedure performed?: [x] No    [] Yes (see summary sheet for update)  Subjective functional status/changes:   [] No changes reported  Pt reports he has noticed his strength is definitely not where it needs to be- he was helping his wife put up the [place] tree and he could feel it.      OBJECTIVE  Modality rationale: Pt deferred   Min Type Additional Details    [] Estim: []Att   []Unatt        []TENS instruct                  []IFC  []Premod   []NMES                     []Other:  []w/US   []w/ice   []w/heat  Position:  Location:    []  Traction: [] Cervical       []Lumbar                       [] Prone          []Supine                       []Intermittent   []Continuous Lbs:  [] before manual  [] after manual    []  Ultrasound: []Continuous   [] Pulsed                           []1MHz   []3MHz Location:  W/cm2:    []  Iontophoresis with dexamethasone         Location: [] Take home patch   [] In clinic    []  Ice     []  heat  []  Ice massage Position:  Location:    []  Vasopneumatic Device Pressure:       [] lo [] med [] hi   Temperature: [] lo [] med [] hi   [] Skin assessment post-treatment:  []intact []redness- no adverse reaction       []redness  adverse reaction:       42 min Therapeutic Exercise:  [] See flow sheet :   Rationale: increase ROM and increase strength to improve the patients ability to change upper limb position for lifting, reaching and dressing tasks      8 min Manual Therapy:  PROM of the shoulder in all planes   Rationale: increase ROM to improve the patient's ability to change upper limb position for lifting, reaching and dressing tasks    Pain Level (0-10 scale) post treatment: 0    ASSESSMENT/Changes in Function: Pt is progressing well, and 1 lb weights were added to scaption today. No pain or substitution noted; pt was encouraged to add light weights to HEP. Patient will continue to benefit from skilled PT services to address ROM deficits, address strength deficits and analyze and cue movement patterns to attain remaining goals.      []  See Plan of Care  []  See progress note/recertification  []  See Discharge Summary         PLAN  []  Upgrade activities as tolerated     [x]  Continue plan of care  []  Update interventions per flow sheet       []  Discharge due to:_  []  Other:_      Sarita Councilman, PT 11/29/2017  7:57 AM

## 2017-12-06 ENCOUNTER — HOSPITAL ENCOUNTER (OUTPATIENT)
Dept: PHYSICAL THERAPY | Age: 52
End: 2017-12-06

## 2017-12-08 ENCOUNTER — APPOINTMENT (OUTPATIENT)
Dept: PHYSICAL THERAPY | Age: 52
End: 2017-12-08

## 2018-03-13 NOTE — PROGRESS NOTES
[facility] AT [address], [address] [place] [address]  Phone: [phone]   [phone]  DISCHARGE SUMMARY  Patient Name: Mark Flaherty : 1965   Treatment/Medical Diagnosis: Left shoulder pain [M25.512]   Referral Source: Steffanie Dueñas MD     Date of Initial Visit: 10/13/17 Attended Visits: 8 Missed Visits: 4 Cx  2 NS     SUMMARY OF TREATMENT  Therapeutic exercise for shoulder ROM and RTC/scapular stabilization, manual therapy to left shoulder, pain modalities, HEP  CURRENT STATUS  Patient attended 8 therapy visits s/p left rotator cuff repair, and demonstrated good progress with pain (0-2/10) and mobility. Unfortunately the patient was discharged after approximately 6 weeks post-op due to repeated cancellation/no-shows and warning. Last measured progress from 17 visit below:    Shoulder ROM: Flexion: 115 AROM/148 PROM, ER 75, IR behind back to L3 level.     · Short Term Goals: To be accomplished in  3-4  weeks: Independent/compliant with long term HEP for shoulder ROM- Met and ongoing  Improve passive shoulder flexion to 135 degrees to improve ADL's- Met  Able to lift upper extremity actively to 90 degrees with min/no substitution to assist reaching ADL's- Met, 115 degrees  · Long Term Goals: To be accomplished in  6-8  weeks:  Improve FOTO outcome score by 30% to indicate a significant improvement in ADL function- Met, improved from 32/100 to 63/100  Patient will report 75% improvement with reaching/lifting ADL's at shoulder level or below- Progressing  Patient will report 60% improvement with dressing/bathing ADL's behind the back- Not met, progressing  RECOMMENDATIONS  Discontinue therapy due to lack of attendance or compliance. If you have any questions/comments please contact us directly at [phone]. Thank you for allowing us to assist in the care of your patient.     Therapist Signature: Kasey Fisher, PT, OCS Date: 3/13/18     Time: 10:42 AM

## 2019-06-21 ENCOUNTER — IMPORTED ENCOUNTER (OUTPATIENT)
Dept: URBAN - METROPOLITAN AREA CLINIC 1 | Facility: CLINIC | Age: 54
End: 2019-06-21

## 2019-06-21 PROBLEM — D22.11: Noted: 2019-06-21

## 2019-06-21 PROBLEM — H01.001: Noted: 2019-06-21

## 2019-06-21 PROBLEM — H25.813: Noted: 2019-06-21

## 2019-06-21 PROBLEM — H01.004: Noted: 2019-06-21

## 2019-06-21 PROBLEM — H04.123: Noted: 2019-06-21

## 2019-06-21 PROCEDURE — 92004 COMPRE OPH EXAM NEW PT 1/>: CPT

## 2019-06-21 PROCEDURE — 92015 DETERMINE REFRACTIVE STATE: CPT

## 2019-06-21 NOTE — PATIENT DISCUSSION
1.  Dry Eyes OU-Increase use of AT to QID OU-sample of Refresh Advanced. Patient can also use SHELBIE around lids to improve any burning around the lids 2. Cataract OU: Observe for now without intervention. The patient was advised to contact us if any change or worsening of vision3. Anterior Blepharitis OU - Daily Hot compresses and lid scrubs (samples given)  were recommended. 4. Lesion lower lid benign OD - The patient has a lesion of the right lower eyelid which appears benign. Measurements were taken and observation at regular intervals was recommended. The patient was asked to report  if there is any growth. 5. Return for an appointment in 1 month for 10. tear lab with Dr. Shanon Peterson.

## 2020-11-20 ENCOUNTER — IMPORTED ENCOUNTER (OUTPATIENT)
Dept: URBAN - METROPOLITAN AREA CLINIC 1 | Facility: CLINIC | Age: 55
End: 2020-11-20

## 2020-11-20 PROBLEM — H04.123: Noted: 2020-11-20

## 2020-11-20 PROBLEM — H25.13: Noted: 2020-11-20

## 2020-11-20 PROBLEM — H01.004: Noted: 2020-11-20

## 2020-11-20 PROBLEM — H01.001: Noted: 2020-11-20

## 2020-11-20 PROCEDURE — 92014 COMPRE OPH EXAM EST PT 1/>: CPT

## 2020-11-20 NOTE — PATIENT DISCUSSION
1.  Dry Eyes OU - Recommend ATs QID OU routinely (sample of Refresh given). Recommend gel ATs QHS OU routinely (sample of gel refresh given). 2. Anterior Blepharitis OU - Daily Hot compresses and lid scrubs were recommended routinely. 3.  Cataract OU: Observe for now without intervention. The patient was advised to contact us if any change or worsening of vision Patient deferred Manifest Rx today. Return for an appointment in 1 year 27 with Dr. Alison Grey.

## 2022-04-02 ASSESSMENT — TONOMETRY
OD_IOP_MMHG: 16
OD_IOP_MMHG: 16
OS_IOP_MMHG: 18
OS_IOP_MMHG: 16

## 2022-04-02 ASSESSMENT — VISUAL ACUITY
OS_CC: 20/20
OD_CC: 20/20
OS_CC: 20/20
OS_SC: J1
OD_CC: 20/20-1
OD_SC: J1+
OD_SC: J1
OS_SC: J1+